# Patient Record
(demographics unavailable — no encounter records)

---

## 2025-03-06 NOTE — HISTORY OF PRESENT ILLNESS
[postmenopausal] : postmenopausal [N] : Patient is not sexually active [PGHxTotal] : 4 [[facility] Services[facility]] : 4 [FreeTextEntry1] :  x 4 SAB x 2 [PGHxABSpont] : 2

## 2025-03-06 NOTE — PROCEDURE
[Cervical Pap Smear] : cervical Pap smear [Liquid Base] : liquid base [General Vaginal Culture] : general vaginal culture [de-identified] : NAAT culture urine culture

## 2025-03-06 NOTE — PHYSICAL EXAM
[Chaperone Present] : A chaperone was present in the examining room during all aspects of the physical examination [No Lymphadenopathy] : no lymphadenopathy [Regular Rate Rhythm] : regular rate rhythm [No Murmurs] : no murmurs [Clear to Auscultation B/L] : clear to auscultation bilaterally [Soft] : soft [Non-tender] : non-tender [Non-distended] : non-distended [No HSM] : No HSM [No Lesions] : no lesions [No Mass] : no mass [Examination Of The Breasts] : a normal appearance [No Masses] : no breast masses were palpable [Labia Majora] : normal [Labia Minora] : normal [Atrophy] : atrophy [Normal] : normal [Uterine Adnexae] : normal [FreeTextEntry2] : siddharth oneill [FreeTextEntry8] : Patient not oriented [FreeTextEntry9] : Guaiac negative

## 2025-03-06 NOTE — PLAN
[FreeTextEntry1] : 85-year-old with history of urosepsis with mild hematuria Will presumptively start on Cipro 500 mg p.o. 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  Will await results of culture.